# Patient Record
Sex: MALE | Race: WHITE
[De-identification: names, ages, dates, MRNs, and addresses within clinical notes are randomized per-mention and may not be internally consistent; named-entity substitution may affect disease eponyms.]

---

## 2018-04-12 ENCOUNTER — HOSPITAL ENCOUNTER (EMERGENCY)
Dept: HOSPITAL 65 - ER | Age: 58
LOS: 1 days | Discharge: HOME | End: 2018-04-13
Payer: COMMERCIAL

## 2018-04-12 VITALS — WEIGHT: 240 LBS | HEIGHT: 72 IN | BODY MASS INDEX: 32.51 KG/M2
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
## 2018-04-12 DIAGNOSIS — Z79.82: ICD-10-CM
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
## 2018-04-12 DIAGNOSIS — Z99.81: ICD-10-CM

## 2018-04-12 DIAGNOSIS — S81.811A: Primary | ICD-10-CM

## 2018-04-12 PROCEDURE — 73590 X-RAY EXAM OF LOWER LEG: CPT

## 2018-04-12 PROCEDURE — 90714 TD VACC NO PRESV 7 YRS+ IM: CPT

## 2018-04-12 PROCEDURE — 99285 EMERGENCY DEPT VISIT HI MDM: CPT

## 2018-04-12 PROCEDURE — 90471 IMMUNIZATION ADMIN: CPT

## 2018-04-12 PROCEDURE — 12035 INTMD RPR S/A/T/EXT 12.6-20: CPT

## 2018-04-12 PROCEDURE — 12005 RPR S/N/A/GEN/TRK12.6-20.0CM: CPT

## 2018-04-12 NOTE — ER.PDOC
General


Chief Complaint:  Requesting Medical Care


Stated Complaint:  DOG BITE


Time seen by MD:  23:51


Source:  patient, EMS


Exam Limitations:  no limitations





History of Present Illness


Initial Comments


Patient presents s/p dog bite. patient states that he was trying to break the 

fight btwn his dog and his girlfriends dog. states after getting inbtwn bit his 

right leg. 


per ems, + ozzing from the leg, two kerlex wer used but now the bleeding has 

improved. patietn wears 2 L of o2 at home, at this baseline o2. patietn states 

last tetanus unknown. patient states no head trauma no loc. prior tot his at 

baseline health.


Onset:  just prior to arrival


Where:  home


Animal:  dog


Animal Appearance:  appeared well


Animal Immunizations:  UTD


Animal Disposition:  Animal Known


Context of Attack:  animals fighting


Severity of Injury:  bitten


Allergies:  


Coded Allergies:  


     No Known Allergies (Unverified , 11/27/17)


Home Meds


Active Scripts


Aspirin (ASPIRIN EC) 81 Mg Tablet.dr, 162 MG PO DAILY for 30 Days, #30 TAB.CHEW


   Prov:TRIPP GRIMES MD         11/30/17


Nicotine (NICOTINE PATCH) 1 Each Patch.td24, 1 EACH TD DAILY for 30 Days, #30 

TR.DRM.PAT


   Prov:TRIPP GRIMES MD         11/30/17


Reported Medications


Hydrocodone Bit/Acetaminophen (NORCO ) 1 Each Tablet, 1 TAB PO Q6 Y for 

PAIN, #60 TAB


   11/28/17


Lisinopril/Hydrochlorothiazide (LISINOPRIL-HCTZ 10-12.5 MG TAB) 1 Each Tablet, 

1 TAB PO DAILY, #30 TAB 5 Refills


   11/28/17





Past Medical History


Medical History:  other (home o2)


Surgical History:  other (shoulder)





Social History


Smoking:  non-smoker


Alcohol Use:  none


Drug Use:  none





Review of Systems


Constitutional:  denies chills, denies fever, denies weakness


Eyes:  denies blindness


Ears:  denies dizziness


Nose:  denies congestion, denies epistaxis


Mouth:  denies loose teeth, denies bloody discharge


Throat:  denies swelling, denies painful swallowing


Respiratory:  denies cough, denies shortness of breath, denies wheezing


Cardiovascular:  denies chest pain, denies edema, denies lightheadedness


Gastrointestinal:  denies abdominal pain, denies diarrhea, denies nausea, 

denies vomiting


Genitourinary:  denies dysuria, denies frequency, denies hematuria


Musculoskeletal:  denies back pain, muscle pain


Skin:  denies change in color, denies dryness


Psychiatric/Neurological:  denies anxiety, denies depressed





Physical Exam


General Appearance:  alert, mild distress


Neuro/Vascular/Tendon:  no vascular compromise, sensation nml, oriented x3, nml 

ROM, CN's nml as tested


HEENT:  atraumatic, PERRL, eye lids/conjun nml, ENT nml external inspect.


Neck:  nml inspection


Resp/CVS:  breath sounds nml, heart sounds nml, reg. rate & rhythm


Abdomen:  nml inspection, non-tender


Back:  nml inspection


Extremities:  ROM nml


Comments


patient right leg wtih several bit marks superficial three abou 1.5 cm each on 

the anterior leg, patient wtih large 5 inch laceration down to fat and muscle. n

/v intact, full rom, 


all other joints and extremities with full rom, n/v intact





ED LACERATION WOUND REPAIR


# of Wounds/Lacerations Presen:  1


Wound Location & Length (Requi:  right lateral calf


Wound Length (cm):  13


Distal NVT:  neuro intact, vasc intact


Anesthesia type:  local


Anesthesia:  1% Lidocaine


Volume Anesthetic (ccs):  20


Wound's Depth, Shape:  into muscle


Wound Explored:  clean


Tendon Intact:  Yes


Wound Repaired With:  sutures


Suture Size/Type:  3:0, prolene


Suture Style:  interupted


Number of Sutures:  7


Layer Closure?:  Yes


Number Deep Layer Sutures:  4


Sterile Dressing Applied?:  Yes





Results/Orders


Results/Orders





Administered Medications








 Medications


  (Trade)  Dose


 Ordered  Sig/Laura


 Route


 PRN Reason  Start Time


 Stop Time Status Last Admin


Dose Admin


 


 Acetaminophen/


 Hydrocodone Bitart


  (Norco 10mg)  1 each  STAT  STAT


 PO


   4/12/18 23:59


 4/13/18 00:02 DC 4/13/18 00:23


 


 


 Amoxicillin/


 Clavulanate


 Potassium


  (Augmentin


 875-125 Tablet)  1 each  STAT  STAT


 PO


   4/12/18 23:59


 4/13/18 00:02 DC 4/13/18 00:23


 











Progress


Progress


patient presents with dog bite. several small lacerations, one big laceration. 

last tetanus unknown. 





tetanus ordered, xrays ordered, augmentin ordered. norco ordered





patients laceration numbed and cleaned





xrays negative. 





patient laceration loosly closed. 





at this [facility] blv pateint is stable to d/c home. will d/c wtih augmentin and f/u 

in 10 days for suture removla. wife and patietn explained results. risks and 

return precautions discussed. understands and agrees with plan





Departure


Time of Disposition:  01:18


Disposition:  01 HOME, SELF-CARE


Impression:  


 Primary Impression:  


 Dog bite


Condition:  Stable


Patient Instructions:  Animal Bite


Referrals:  


TONY RIVERO-C (PCP)


PRIMARY CARE PROVIDER





Additional Instructions:  


please return in 10 days to have your sutures removed


Duration or Time Spent with Pa:  60











DANNY CHRISTINE MD Apr 12, 2018 23:52

## 2018-04-13 VITALS — SYSTOLIC BLOOD PRESSURE: 136 MMHG | DIASTOLIC BLOOD PRESSURE: 70 MMHG

## 2018-04-13 NOTE — DIREP
PROCEDURE:XRAY TIB & FIB 2 VW-RT

 

COMPARISON:None.

 

INDICATIONS:dog bite

 

FINDINGS:

BONES:Normal.

JOINTS:Normal.

SOFT TISSUES:Ventral/lateral soft tissue laceration and swelling.  No 

radiopaque foreign bodies identified.

OTHER:No additional findings.

 

CONCLUSION:

 

No acute bony abnormality.

 

Soft tissue swelling and laceration.  No radiopaque foreign bodies noted

 

 

 

Dictated by: DEANDRE Schmitt M.D. on 04/13/2018 at 00:53 AM     

Electronically Signed By: DEANDRE Schmitt M.D. on 04/13/2018 at 00:54 AM

## 2018-04-23 ENCOUNTER — HOSPITAL ENCOUNTER (EMERGENCY)
Dept: HOSPITAL 65 - ER | Age: 58
Discharge: HOME | End: 2018-04-23
Payer: COMMERCIAL

## 2018-04-23 VITALS — BODY MASS INDEX: 31.81 KG/M2 | WEIGHT: 240 LBS | HEIGHT: 73 IN

## 2018-04-23 VITALS — SYSTOLIC BLOOD PRESSURE: 144 MMHG | DIASTOLIC BLOOD PRESSURE: 76 MMHG
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
## 2018-04-23 DIAGNOSIS — S81.811D: Primary | ICD-10-CM

## 2018-04-23 DIAGNOSIS — J44.9: ICD-10-CM

## 2018-04-23 DIAGNOSIS — Z79.82: ICD-10-CM

## 2018-04-23 PROCEDURE — 99281 EMR DPT VST MAYX REQ PHY/QHP: CPT

## 2019-03-18 ENCOUNTER — HOSPITAL ENCOUNTER (INPATIENT)
Dept: HOSPITAL 65 - ER | Age: 59
LOS: 2 days | Discharge: HOME | DRG: 684 | End: 2019-03-20
Attending: INTERNAL MEDICINE | Admitting: INTERNAL MEDICINE
Payer: COMMERCIAL

## 2019-03-18 VITALS — HEIGHT: 72.5 IN | WEIGHT: 262.44 LBS | BODY MASS INDEX: 35.16 KG/M2

## 2019-03-18 VITALS — DIASTOLIC BLOOD PRESSURE: 81 MMHG | SYSTOLIC BLOOD PRESSURE: 141 MMHG

## 2019-03-18 DIAGNOSIS — J44.9: ICD-10-CM

## 2019-03-18 DIAGNOSIS — R55: ICD-10-CM

## 2019-03-18 DIAGNOSIS — Z79.82: ICD-10-CM

## 2019-03-18 DIAGNOSIS — N17.9: Primary | ICD-10-CM

## 2019-03-18 DIAGNOSIS — I10: ICD-10-CM

## 2019-03-18 DIAGNOSIS — I45.81: ICD-10-CM

## 2019-03-18 DIAGNOSIS — G47.33: ICD-10-CM

## 2019-03-18 DIAGNOSIS — F10.10: ICD-10-CM

## 2019-03-18 DIAGNOSIS — M19.90: ICD-10-CM

## 2019-03-18 DIAGNOSIS — Z79.899: ICD-10-CM

## 2019-03-18 DIAGNOSIS — I65.22: ICD-10-CM

## 2019-03-18 DIAGNOSIS — E86.0: ICD-10-CM

## 2019-03-18 DIAGNOSIS — Z71.41: ICD-10-CM

## 2019-03-18 LAB
ALP INTEST CFR SERPL: 82 U/L (ref 50–136)
ALT SERPL-CCNC: 42 U/L (ref 12–78)
AST SERPL-CCNC: 26 U/L (ref 0–35)
BASOPHILS # BLD AUTO: 0 10^3/UL (ref 0–0.1)
BASOPHILS NFR BLD AUTO: 0.4 % (ref 0–0.2)
CALCIUM SERPL-MCNC: 9.2 MG/DL (ref 8.4–10.5)
CO2 BLDA-SCNC: 23.1 MMOL/L (ref 20–32)
EOSINOPHIL # BLD AUTO: 0.2 10^3/UL (ref 0–0.2)
EOSINOPHIL NFR BLD AUTO: 2.1 % (ref 0–5)
ERYTHROCYTE [DISTWIDTH] IN BLOOD BY AUTOMATED COUNT: 12.7 % (ref 11.5–14.5)
GLUCOSE PRE 100 G GLC PO SERPL-MCNC: 142 MG/DL (ref 70–110)
HGB BLD-MCNC: 14 G/DL (ref 13.9–16.3)
LYMPHOCYTES # BLD AUTO: 2.4 10^3/UL (ref 1–4.8)
LYMPHOCYTES NFR BLD AUTO: 28.6 % (ref 24–44)
MCH RBC QN AUTO: 30.6 PG (ref 26–34)
MCHC RBC AUTO-ENTMCNC: 34.6 G/DL (ref 33–37)
MCV RBC AUTO: 88.6 FL (ref 78–100)
MONOCYTES # BLD AUTO: 1 10^3/UL (ref 0.3–0.8)
MONOCYTES NFR BLD AUTO: 12 % (ref 5–12)
NEUTROPHILS # BLD AUTO: 4.8 10^3/UL (ref 1.8–7.7)
NEUTROPHILS NFR BLD AUTO: 56.8 % (ref 41–85)
PLATELET # BLD AUTO: 214 10^3/UL (ref 150–400)
PMV BLD AUTO: 10.9 FL (ref 7.8–11)
WBC # BLD AUTO: 8.4 10^3/UL (ref 4.5–11)

## 2019-03-18 PROCEDURE — 85730 THROMBOPLASTIN TIME PARTIAL: CPT

## 2019-03-18 PROCEDURE — 85025 COMPLETE CBC W/AUTO DIFF WBC: CPT

## 2019-03-18 PROCEDURE — 93005 ELECTROCARDIOGRAM TRACING: CPT

## 2019-03-18 PROCEDURE — 85610 PROTHROMBIN TIME: CPT

## 2019-03-18 PROCEDURE — 85379 FIBRIN DEGRADATION QUANT: CPT

## 2019-03-18 PROCEDURE — 99285 EMERGENCY DEPT VISIT HI MDM: CPT

## 2019-03-18 PROCEDURE — 86677 HELICOBACTER PYLORI ANTIBODY: CPT

## 2019-03-18 PROCEDURE — 83880 ASSAY OF NATRIURETIC PEPTIDE: CPT

## 2019-03-18 PROCEDURE — 82550 ASSAY OF CK (CPK): CPT

## 2019-03-18 PROCEDURE — 80053 COMPREHEN METABOLIC PANEL: CPT

## 2019-03-18 PROCEDURE — 93306 TTE W/DOPPLER COMPLETE: CPT

## 2019-03-18 PROCEDURE — 84484 ASSAY OF TROPONIN QUANT: CPT

## 2019-03-18 PROCEDURE — 94640 AIRWAY INHALATION TREATMENT: CPT

## 2019-03-18 PROCEDURE — 36415 COLL VENOUS BLD VENIPUNCTURE: CPT

## 2019-03-18 PROCEDURE — 93880 EXTRACRANIAL BILAT STUDY: CPT

## 2019-03-18 NOTE — ER.PDOC
General


Chief Complaint:  Syncope


Stated Complaint:  SYNCOPE


Time seen by MD:  10:55


Source:  patient


Exam Limitations:  no limitations





History of Present Illness


Timing/Prior Episodes:  no prior history


Precipitating Factors:  sitting


Loss of Consciousness:  Brief (Seconds)


Location of Injury:  None


Current Symptoms:  back to normal


Allergies:  


Coded Allergies:  


     No Known Allergies (Unverified , 11/27/17)


Home Meds


Active Scripts


Aspirin (ASPIRIN EC) 81 Mg Tablet.dr, 162 MG PO DAILY for 30 Days, #30 TAB.CHEW


   Prov:TRIPP GRIMES MD         11/30/17


Nicotine (NICOTINE PATCH) 1 Each Patch.td24, 1 EACH TD DAILY for 30 Days, #30 

TR.DRM.PAT


   Prov:TRIPP GRIMES MD         11/30/17


Reported Medications


Hydrocodone Bit/Acetaminophen (NORCO ) 1 Each Tablet, 1 TAB PO Q6 PRN for 

PAIN, #60 TAB


   11/28/17


Lisinopril/Hydrochlorothiazide (LISINOPRIL-HCTZ 10-12.5 MG TAB) 1 Each Tablet, 

1 TAB PO DAILY, #30 TAB 5 Refills


   11/28/17





Past Medical History


Medical History:  COPD, hypertension


Surgical History:  other





Social History


Smoking:  non-smoker


Alcohol Use:  occassionally


Drug Use:  none





Reviewed


Nursing Reviewed:  Vital Signs, Abn. Noted





Review of Systems


All Other Systems:  Reviewed and Negative





Physical Exam


General Appearance:  No Apparent Distress, WD/WN


HEENT:  PERRL/EOMI, Normal ENT Inspection, TMs Normal, Pharynx Normal


Neck:  Non-Tender, Full Range of Motion, Supple, Normal Inspection


Cardiovascular/Respiratory:  Regular Rate, Rhythm, No M/R/G, Normal Peripheral 

Pulses, No JVD, Normal Breath Sounds, No Respiratory Distress


Gastrointestinal:  Normal Bowel Sounds, No Organomegaly, No Pulsatile Mass, Non 

Tender, Soft


Extremities:  Normal Range of Motion, Non-Tender, Normal Inspection, No Pedal 

Edema, No Calf Tenderness, Normal Capillary Refill


Psychiatric:  Alert, Oriented x 3


Cranial Nerves:  Normal Hearing, Normal Speech, PERRL


Coordination/Gait:  Normal Finger to Nose, Normal Gait, Negative Romberg's Sign


Motor/Sensory:  No Motor Deficit, No Sensory Deficit, No Pronator Drift, 

Negative Babinski's Sign


Skin:  Normal Color, Warm/Dry


Lymphatic:  No Adenopathy





Results/Orders


Results/Orders





Orders - WANDER SO MD


Cbc With Auto Diff (3/18/19 22:08)


Comprehensive Metabolic Panel (3/18/19 22:08)


Creatine Kinase (3/18/19 22:08)


Troponin I (3/18/19 22:08)


Probnp    B-Type Np (3/18/19 22:08)


PT (3/18/19 22:08)


Partial Thromboplastin Time. (3/18/19 22:08)


Helicobacter Pylori (3/18/19 22:08)


D-Dimer (3/18/19 22:08)


Ekg-Routine (3/18/19 22:08)


0.9 % Sodium Chloride (Ns 1000ml) (3/18/19 22:08)


0.9 % Sodium Chloride (Ns 1000ml) (3/18/19 22:24)


Morphine Sulfate (Morphine Sulfate) (3/18/19 22:48)


Ondansetron Hcl (Zofran) (3/18/19 22:49)





Vital Signs








  Date Time  Temp Pulse Resp B/P (MAP) Pulse Ox O2 Delivery O2 Flow Rate FiO2


 


3/18/19 22:33 98.2 80 18 141/81 (101) 97 Room Air  





 98.2     Nasal Canula  


 


3/18/19 22:16 98.2 80 18     





 98.2       


 


3/18/19 22:16  80 18  97 Nasal Canula  








Administered Medications








 Medications


  (Trade)  Dose


 Ordered  Sig/Laura


 Route


 PRN Reason  Start Time


 Stop Time Status Last Admin


Dose Admin


 


 Morphine Sulfate


  (Morphine


 Sulfate)  4 mg  STAT  STAT


 IV


   3/18/19 22:48


 3/18/19 22:49 UNV 3/18/19 23:12


4 MG


 


 Ondansetron HCl


  (Zofran)  4 mg  STAT  STAT


 IV


   3/18/19 22:49


 3/18/19 22:50 UNV 3/18/19 23:12


4 MG


 


 Sodium Chloride  1,000 ml @ 


 0 mls/hr  Q0M STAT


 IV


   3/18/19 22:08


 3/18/19 22:10 DC 3/18/19 22:10


1,000 MLS/HR








 Laboratory Tests








Test


 3/18/19


22:00


 


White Blood Count


 8.4 10^3/uL


(4.5-11.0)


 


Red Blood Count


 4.57 10^6/uL


(4.50-5.90)


 


Hemoglobin


 14.0 g/dL


(13.9-16.3)


 


Hematocrit


 40.5 %


(37.0-53.0)


 


Mean Corpuscular Volume


 88.6 fL


()


 


Mean Corpuscular Hemoglobin


 30.6 pg


(26-34)


 


Mean Corpuscular Hemoglobin


Concent 34.6 g/dL


(33-37)


 


Red Cell Distribution Width


 12.7 %


(11.5-14.5)


 


Platelet Count


 214 10^3/uL


(150-400)


 


Mean Platelet Volume


 10.9 fL


(7.8-11.0)


 


Neutrophils (%) (Auto)


 56.8 %


(41.0-85.0)


 


Lymphocytes (%) (Auto)


 28.6 %


(24.0-44.0)


 


Monocytes (%) (Auto)


 12.0 %


(5.0-12.0)


 


Neutrophils # (Auto)


 4.8 10^3/uL


(1.8-7.7)


 


Lymphocytes # (Auto)


 2.4 10^3/uL


(1.0-4.8)


 


Monocytes # (Auto)


 1.0 10^3/uL


(0.3-0.8)  H


 


Absolute Immature Granulocyte


(auto 0.01 10^3 u/L


(0-2)


 


Eosinophils %


 2.1 %


(0.0-5.0)


 


Basophils %


 0.4 %


(0.0-0.2)  H


 


Basophils #


 0.0 10^3/uL


(0.0-0.1)


 


Eosinophil Count


 0.2 10^3/uL


(0.0-0.2)


 


Prothrombin Time


 10.1 SEC


(9.8-11.9)


 


Prothrombin Time INR


(Non-Therap) 1.0  





 


PTT


 21.7 SEC


(24.67-30.72)


 


D-Dimer


 0.25 mg/L


(0.19-0.49)


 


Sodium Level


 134 mmol/L


(132-145)


 


Potassium Level


 3.4 mmol/L


(3.6-5.2)  L


 


Chloride Level


 95.0 mmol/L


()  L


 


Carbon Dioxide Level


 23.1 mmol/L


(20.0-32)


 


Anion Gap 19.3  


 


Blood Urea Nitrogen


 27 mg/dL


(7-18)  H


 


Creatinine


 1.50 mg/dL


(0.59-1.40)  H


 


Estimated GFR (


American) 58.4 (>/=60)  





 


BUN/Creatinine Ratio 18.0  


 


Glucose Level


 142 mg/dL


()  H


 


Calcium Level


 9.2 mg/dL


(8.4-10.5)


 


Total Bilirubin


 0.4 mg/dL


(0.2-1.0)


 


Aspartate Amino Transferase


(AST) 26 U/L (0-35)  





 


Alanine Aminotransferase (ALT)


 42 U/L (12-78)





 


Alkaline Phosphatase


 82 U/L


()


 


Total Creatine Kinase


 196 U/L


()


 


Troponin I


 < 0.02 ng/mL


(0.00-0.05)


 


Pro-B-Type Natriuretic Peptide


 60 pg/mL


(0-125)


 


Total Protein


 7.2 g/dL


(6.4-8.2)


 


Albumin


 3.9 g/dL


(3.4-5.0)


 


Globulin 3.3  


 


Percent Immature Gran (Cell


Imm) 0.10 %


(0.00-0.50)


 


Helicobacter pylori Screen


 NEGATIVE


(NEGATIVE)











EKG/XRAY/CT/US


EKG:  NSR, no ST T wave changes





Consult/PCP


Time Consult/PCP Called:  11:33


Consult/PCP:   DR. SMART





Departure


Time of Disposition:  23:33


Disposition:  09 ADMITTED AS INPATIENT


Impression:  


 Primary Impression:  


 Syncope and collapse


Condition:  Improved


Referrals:  


TONY RIVERO (PCP)


PRIMARY CARE PROVIDER


Duration or Time Spent with Pa:  2 HRS











WANDER SO MD Mar 18, 2019 22:38

## 2019-03-18 NOTE — PCM.EKG
[facility]

                                       

Test Date:    2019               Test Time:    22:21:33

Pat Name:     LARRY PACK            Department:   

Patient ID:   [id number]          Room:         332

Gender:       M                        Technician:   JORGE

:          1960               Requested By: CHARLES BAINS

Order Number: [id number]           Reading MD:   Charles Bains

                                 Measurements

Intervals                              Axis          

Rate:         83                       P:            46

AR:           170                      QRS:          15

QRSD:         94                       T:            56

QT:           400                                    

QTc:          470                                    

                           Interpretive Statements

Normal sinus rhythm

Nonspecific ST and T wave abnormality

Prolonged QT

Abnormal ECG

Compared to ECG 2017 10:43:59

ST (T wave) deviation now present



Electronically Signed On 3- 6:02:56 CDT by Charles Bains



Please click the below link to view image of tracing.

## 2019-03-19 VITALS — SYSTOLIC BLOOD PRESSURE: 125 MMHG | DIASTOLIC BLOOD PRESSURE: 76 MMHG

## 2019-03-19 VITALS — SYSTOLIC BLOOD PRESSURE: 128 MMHG | DIASTOLIC BLOOD PRESSURE: 74 MMHG

## 2019-03-19 VITALS — SYSTOLIC BLOOD PRESSURE: 122 MMHG | DIASTOLIC BLOOD PRESSURE: 68 MMHG

## 2019-03-19 VITALS — DIASTOLIC BLOOD PRESSURE: 84 MMHG | SYSTOLIC BLOOD PRESSURE: 134 MMHG

## 2019-03-19 VITALS — SYSTOLIC BLOOD PRESSURE: 117 MMHG | DIASTOLIC BLOOD PRESSURE: 70 MMHG

## 2019-03-19 VITALS — SYSTOLIC BLOOD PRESSURE: 130 MMHG | DIASTOLIC BLOOD PRESSURE: 72 MMHG

## 2019-03-19 VITALS — DIASTOLIC BLOOD PRESSURE: 61 MMHG | SYSTOLIC BLOOD PRESSURE: 119 MMHG

## 2019-03-19 RX ADMIN — TRAMADOL HYDROCHLORIDE PRN MG: 50 TABLET ORAL at 22:04

## 2019-03-19 RX ADMIN — TRAMADOL HYDROCHLORIDE PRN MG: 50 TABLET ORAL at 16:16

## 2019-03-19 RX ADMIN — PANTOPRAZOLE SODIUM SCH MG: 40 TABLET, DELAYED RELEASE ORAL at 09:07

## 2019-03-19 RX ADMIN — TRAMADOL HYDROCHLORIDE PRN MG: 50 TABLET ORAL at 08:01

## 2019-03-19 RX ADMIN — POTASSIUM CHLORIDE AND SODIUM CHLORIDE SCH MLS/HR: 900; 150 INJECTION, SOLUTION INTRAVENOUS at 19:30

## 2019-03-19 RX ADMIN — TRAMADOL HYDROCHLORIDE PRN MG: 50 TABLET ORAL at 12:29

## 2019-03-19 RX ADMIN — IPRATROPIUM BROMIDE AND ALBUTEROL SULFATE PRN ML: .5; 2.5 SOLUTION RESPIRATORY (INHALATION) at 15:42

## 2019-03-19 RX ADMIN — POTASSIUM CHLORIDE AND SODIUM CHLORIDE SCH MLS/HR: 900; 150 INJECTION, SOLUTION INTRAVENOUS at 10:33

## 2019-03-19 NOTE — NUR
PATIENT REPORT BACK PAIN NO LONGER BEING CONTROLLED BY CURRENT MEDICATIONS.

DR. VINSON NOTIFIED

NEW ORDERS GIVEN TO INCREASE ULTRAM FROM 50MG TO 100MG Q4 PRN.  RBTO.

## 2019-03-19 NOTE — PCM.HP
History of Present Illness


Reason for Visit:  syncope


History of Present Illness


Patient is a 58 M PMH of HTN, COPD, BRIAN, AVM who presents with Syncope last 

night after dinner.  Patient was eating in restaurant with family last night 

when he started to feel dizzy and fatigued.  Patient went to vehicle in parking 

lot early and took nap while family was finishing dinner.  She rested awhile 

and then during the vehicle ride home, his girlfriend stated that he passed out 

for several minutes.  Patient returned to consciousness.  In ER, EKG showed NSR 

with mild prolongation of QT.  Patient denies chest pain, fever, nausea, 

vomiting, or any other concerning symptoms.  BP was low for patient.  He was 

also found to be dehydrated in ER and have LYNNETTE.  Patient feels much better 

overnight.  Patient had LHC performed one year ago which was negative for 

obstructive disease.  He sees his PCP outpatient for management of HTN.  He 

denies hx of heart failure, DM, HLD.  Patient diet has improved and he feels 

back to normal.  He does have hx of AVM and was seen by Neurologist and 

notified there is no treatment options for condition.


Past Medical History


Cardiac:  HTN


Pulmonary:  COPD


CNS:  Other


Musculoskeletal:  Chronic Low Back Pain, Osteoarthritis


Past Surgical History:  Other (right shoulder, LHC)





Past Social History


Smoke:  Quit


Alcohol:  heavy


Drugs:  None


Lives:  with Family





Travel Hx


EBOLA RISK:Travel to/contact w:  No


Is pt experiencing any Ebola s:  No





Review of Systems


Constitutional:  Weakness; No: Fever, Chills


Eyes:  No: Conjunctivae inflammation, Eyelid inflammation


ENT:  Other; No: Nose discharge, Nose congestion


Respiratory:  No: Cough, Shortness of breath, SOB with excertion, Wheezing


Cardiovascular:  Lt Headedness; No: Chest Pain, Palpitations, Edema


Gastrointestinal:  No: Nausea, Vomiting, Abdominal Pain


Genitourinary:  No Hematuria, No Retention


Musculoskeletal:  back pain; No: neck pain


Skin:  No: Rash, Lesions, Jaundice, Bruising


Neurological:  No: Weakness, Numbness, Incoordination, Change in speech, 

Confusion, Seizures


Allergies:  


Coded Allergies:  


     No Known Allergies (Unverified , 11/27/17)


Scheduled


Aspirin (Aspirin Ec), 162 MG PO DAILY


Citalopram Hydrobromide (Citalopram Hbr), 20 MG PO HS, (Reported)


Hydrochlorothiazide (Hydrochlorothiazide), 25 MG PO DAILY24, (Reported)


Lisinopril (Lisinopril), 40 MG PO DAILY24, (Reported)


Lisinopril/Hydrochlorothiazide (Lisinopril-Hctz 10-12.5 Mg Tab), 1 TAB PO DAILY,

 (Reported)


Nicotine (Nicotine Patch), 1 EACH TD DAILY





Scheduled PRN


Hydrocodone Bit/Acetaminophen (Norco ), 1 TAB PO Q6 PRN for PAIN, (

Reported)





VTE


VTE Risk Total Score:  3


VTE Risk Score


VTE Risk:


Score 0-1 = Low Risk


(Aggressive mobilization; early ambulation; no VTE prophylaxis required)


Score 2: Moderate Risk


(Intermittent/Pneumatic Compression Device OR Lovenox/Heparin/Coumadin)


Score 3-4: High Risk


(Intermittent/Pneumatic Compression Device AND Lovenox/Heparin/Coumadin)


Score  > or =5: Highest Risk


(Intermittent/Pneumatic Compression Device AND Lovenox/Heparin/Coumadin)





VTE


VTE Present on Admission:  No


Currently receiving anticoagul:  No


VTE Risk Total Score:  3





Exam


Vital Signs





Vital Signs








  Date Time  Temp Pulse Resp B/P (MAP) Pulse Ox O2 Delivery O2 Flow Rate FiO2


 


3/19/19 08:09      Room Air  


 


3/19/19 08:02 97.9 80 16 134/84 (101) 96   





 97.9       


 


3/19/19 04:07       2.00 








General Appearance:  Alert, Oriented X3, Cooperative, No acute distress


HEENT:  Atraumatic, PERRLA, EOMI, Mucous membr. moist/pink


Respiratory:  Clear to auscultation, Normal air movement


Cardiovascular:  Regular rate, Normal S1, Normal S2, No murmurs


Abdominal:  Normal bowel sounds, Soft, No tenderness


Extremities:  No edema, Normal pulses, No tenderness/swelling


Skin:  No rash, No breakdown, No lesions


Neuro:  Normal speech, Strength at 5/5 X4 ext, Normal tone, Sensation intact, 

Cranial nerves 3-12 NL


Psych/Mental Status:  Mental status NL, Mood NL





Assessment/Plan


Patient is a 58 M PMH of HTN, COPD, BRIAN, AVM who presents with Syncope last 

night after dinner.


Plan


1. Syncopal Episode:  Pending Carotid Dopplers, Echo.  Patient on telemetry.  

Will monitor vitals, recheck labs in AM.





2. HTN:  holding home meds 2/2 LYNNETTE.  Monitor vitals





3. LYNNETTE:  cont IVF, recheck metabolic panel in AM.





4. COPD:  nebs, O2 support PRN.





5. PPx:  PPI, SCDs





6. Alcohol Abuse:  counseled cessation, monitor vitals for withdrawals











ALLYN VINSON MD Mar 19, 2019 10:47

## 2019-03-19 NOTE — NUR
DISCHARGE PLAN

CM VISITED WITH PATIENT AND WIFE CONCERNING PATIENTS DISCHARGE PLAN AND NEED. PATIENT STATED 
HE LIVES @ HOME WITH HIS WIFE AND HE IS INDEPENDENT ON ADLS. PATIENT STATED HE IS ON 
DISABILITY AND UNEMPLOYED. PATIENT DOES NOT CURRENTLY USE ANY TYPE OF DME FOR ASSISTANCE. 
PATIENT DOES USE O2 IN THE HOME THAT IS SERVICED BY [facility], HE WAS ASKING FOR PORTABILITY 
BUT CANNOT FINANCIALLY AFFORD IT AT THIS TIME. CM WILL CONTINUE TO FOLLOW PATIENT FOR ANY 
FURTHER DISCHARGE NEEDS. CURRENT GOAL FOR PATIENT IS TO RETURN BACK HOME WITH FAMILY TO 
ROUTINE CARE UPON DISCHARGE.

## 2019-03-19 NOTE — DIREP
PROCEDURE:US DOPPLER CAROTID BILATERAL

COMPARISON:None.

INDICATIONS:syncope

TECHNIQUE:Sonographic evaluation of carotid arteries was performed together 

with grayscale, color-flow, and spectral analysis.

FINDINGS:

PEAK FLOW VELOCITIES (cm/sec)

RIGHT CCA:

             PROX:82.0 cm/s

             DIST:117.8 cm/s

RIGHT ICA:

             PROX:118.1 cm/s

             MID:66.2 cm/s

             DIST:65.5 cm/s

RIGHT ECA:135.8 cm/s

RIGHT ICA/CCA:1.0

RIGHT VERTEBRAL:36.6 cm/s; Antegrade

IMAGES:There is some noncalcified plaque.

 

LEFT CCA:

             PROX:115.7 cm/s

             DIST:89.2 cm/s

LEFT ICA:

             PROX:165.3 cm/s

             MID:83.0 cm/s

             DIST:71.0 cm/s

LEFT ECA:71.4 cm/s

LEFT ICA/CCA:1.9

LEFT VERTEBRAL:41.8 cm/s; Antegrade

IMAGES:There is makes plaque.

 

 

CONCLUSION:Velocities suggest at least 50% stenosis in the proximal left ICA.

 

 

 

Diameter Stenosis (%)ICA Peak Systolic Velocity (cm/s)ICA/CCA 

RatioNormal<125<2.0<50<125<[id number]>2-470 to near occlusion>230>4J 

Ultrasound Med 2005; 24:0662-1626

 

 

 

Dictated by: STEPH Physician on 03/19/2019 at 01:13 PM     

Electronically Signed By: Billy Bello MD on 03/19/2019 at 03:36 PM   

   

 

 

ld

## 2019-03-20 VITALS — DIASTOLIC BLOOD PRESSURE: 75 MMHG | SYSTOLIC BLOOD PRESSURE: 124 MMHG

## 2019-03-20 VITALS — DIASTOLIC BLOOD PRESSURE: 65 MMHG | SYSTOLIC BLOOD PRESSURE: 125 MMHG

## 2019-03-20 VITALS — DIASTOLIC BLOOD PRESSURE: 84 MMHG | SYSTOLIC BLOOD PRESSURE: 120 MMHG

## 2019-03-20 LAB
ALP INTEST CFR SERPL: 68 U/L (ref 50–136)
ALT SERPL-CCNC: 34 U/L (ref 12–78)
AST SERPL-CCNC: 19 U/L (ref 0–35)
BASOPHILS # BLD AUTO: 0 10^3/UL (ref 0–0.1)
BASOPHILS NFR BLD AUTO: 0.4 % (ref 0–0.2)
CALCIUM SERPL-MCNC: 8.4 MG/DL (ref 8.4–10.5)
CO2 BLDA-SCNC: 25 MMOL/L (ref 20–32)
EOSINOPHIL # BLD AUTO: 0.2 10^3/UL (ref 0–0.2)
EOSINOPHIL NFR BLD AUTO: 2.8 % (ref 0–5)
ERYTHROCYTE [DISTWIDTH] IN BLOOD BY AUTOMATED COUNT: 13.1 % (ref 11.5–14.5)
GLUCOSE PRE 100 G GLC PO SERPL-MCNC: 97 MG/DL (ref 70–110)
HGB BLD-MCNC: 13.2 G/DL (ref 13.9–16.3)
LYMPHOCYTES # BLD AUTO: 1.7 10^3/UL (ref 1–4.8)
LYMPHOCYTES NFR BLD AUTO: 29.8 % (ref 24–44)
MCH RBC QN AUTO: 30.9 PG (ref 26–34)
MCHC RBC AUTO-ENTMCNC: 33.1 G/DL (ref 33–37)
MCV RBC AUTO: 93.4 FL (ref 78–100)
MONOCYTES # BLD AUTO: 0.7 10^3/UL (ref 0.3–0.8)
MONOCYTES NFR BLD AUTO: 12.4 % (ref 5–12)
NEUTROPHILS # BLD AUTO: 3.1 10^3/UL (ref 1.8–7.7)
NEUTROPHILS NFR BLD AUTO: 54.4 % (ref 41–85)
PLATELET # BLD AUTO: 170 10^3/UL (ref 150–400)
PMV BLD AUTO: 10.5 FL (ref 7.8–11)
WBC # BLD AUTO: 5.7 10^3/UL (ref 4.5–11)

## 2019-03-20 RX ADMIN — TRAMADOL HYDROCHLORIDE PRN MG: 50 TABLET ORAL at 13:44

## 2019-03-20 RX ADMIN — IPRATROPIUM BROMIDE AND ALBUTEROL SULFATE PRN ML: .5; 2.5 SOLUTION RESPIRATORY (INHALATION) at 09:37

## 2019-03-20 RX ADMIN — POTASSIUM CHLORIDE AND SODIUM CHLORIDE SCH MLS/HR: 900; 150 INJECTION, SOLUTION INTRAVENOUS at 04:18

## 2019-03-20 RX ADMIN — TRAMADOL HYDROCHLORIDE PRN MG: 50 TABLET ORAL at 04:19

## 2019-03-20 RX ADMIN — TRAMADOL HYDROCHLORIDE PRN MG: 50 TABLET ORAL at 09:27

## 2019-03-20 RX ADMIN — PANTOPRAZOLE SODIUM SCH MG: 40 TABLET, DELAYED RELEASE ORAL at 08:55

## 2019-03-20 NOTE — PCM.EKG
[facility]

                                       

Test Date:    2019               Test Time:    08:30:44

Pat Name:     LARRY PACK            Department:   

Patient ID:   [id number]          Room:         332 A

Gender:       M                        Technician:   ANANT

:          1960               Requested By: ALLYN VINSON

Order Number: [id number]           Reading MD:   Bj Simms

                                 Measurements

Intervals                              Axis          

Rate:         75                       P:            57

NC:           154                      QRS:          34

QRSD:         90                       T:            64

QT:           388                                    

QTc:          433                                    

                           Interpretive Statements

Normal sinus rhythm

Normal ECG

Compared to ECG 2019 22:21:33

ST (T wave) deviation no longer present

Prolonged QTc interval no longer present



Electronically Signed On 3- 8:09:29 CDT by Bj Simms



Please click the below link to view image of tracing.

## 2019-03-20 NOTE — NUR
Report



Assumed care of patient after report received from Davina ERWIN at change of shift. Family 
member sleeping at thew bedside. Ultram 100mg for back pain.

## 2019-03-20 NOTE — PRM.DC
Discharge Summary


Date of Discharge:  Mar 20, 2019


Time of Request to Discharge:  13:20


Reason for Visit:  syncope


Hospital Course


Patient admitted with concern for syncopal episode.  Patient was admitted and 

placed on Telemetry.  Patient had no major events on Telemetry.   Patient had 

mild prolongation of QT on EKG on admission but with rehydration, Prolonged Qt 

resolved.  Patient had Carotid dopplers showing L ICA stenosis of at least 50%, 

patient will be started on Statin for symptoms.  Patient also had LYNNETTE.  Patient 

treated with IVF with improvement in symptoms.  Patient's BP medications were 

held and we will not d/c patient on medications.  He will check BP 2-3x daily 

and f/u with PCP within 1-2 weeks.  Patient had Echocardiogram performed.  

Cardiologist has not read yet but no major valvular abnormalities and no heart 

failure present.  Patient is ready to go home, he denies complaints.


General:  Alert, Oriented X3, Cooperative, No acute distress


HEENT:  Atraumatic, PERRLA, EOMI, Mucous membr. moist/pink


Neck:  Supple, No JVD


Lungs:  Clear to auscultation, Normal air movement


Heart:  Regular rate, Normal S1, Normal S2, No murmurs


Abdomen:  Normal bowel sounds, Soft, No tenderness


Extremities:  No edema, Normal pulses, No tenderness/swelling


Skin:  No rashes, No breakdown, No significant lesion


Neuro:  Normal gait, Normal speech, Strength at 5/5 X4 ext, Normal tone, 

Sensation intact, Cranial nerves 3-12 NL


Psych/Mental Status:  Mental status NL, Mood NL


Scheduled


Aspirin (Aspirin Ec), 162 MG PO DAILY


Atorvastatin 20MG (Lipitor 20MG), 40 MG PO HS


Citalopram Hydrobromide (Citalopram Hbr), 20 MG PO HS, (Reported)


Hydrochlorothiazide (Hydrochlorothiazide), 25 MG PO DAILY24, (Reported)


Lisinopril (Lisinopril), 40 MG PO DAILY24, (Reported)


Lisinopril/Hydrochlorothiazide (Lisinopril-Hctz 10-12.5 Mg Tab), 1 TAB PO DAILY,

 (Reported)


Nicotine (Nicotine Patch), 1 EACH TD DAILY





Scheduled PRN


Hydrocodone Bit/Acetaminophen (Norco ), 1 TAB PO Q6 PRN for PAIN, (

Reported)


Sepsis Evaluation @ Discharge


             


3/20/19 


                                     


                                     


12:47





Course


Sepsis Screening Results: Posi:  NEGATIVE


Sepsis Qualifier/Stage:  NO DEFINITE RISK


Duration or Total Time Spent w:  2 HRS


Vitals & review Data





Vital Sign - Last 24 Hours








 3/19/19 3/19/19 3/19/19 3/19/19





 15:40 15:41 15:45 15:52


 


Pulse 68 68  70


 


Resp 15 15 15 15


 


Pulse Ox 95 95 95 95


 


O2 Delivery  Nasal Cannula  


 


O2 Flow Rate  2.00  


 


FiO2  28  





 3/19/19 3/19/19 3/19/19 3/19/19





 16:24 19:33 19:40 20:52


 


Temp 98.3 97.8  





 98.3 97.8  


 


Pulse 75 70  69


 


Resp 16 18  15


 


B/P (MAP) 130/72 (91) 117/70 (86)  


 


Pulse Ox 96 96  96


 


O2 Delivery  Nasal Canula Nasal Cannula Nasal Cannula


 


O2 Flow Rate  2.00 2.00 2.00


 


    





    





 3/19/19 3/20/19 3/20/19 3/20/19





 23:15 04:00 08:49 09:38


 


Temp 98.3 97.8 98.1 





 98.3 97.8 98.1 


 


Pulse 69 71 77 72


 


Resp 18 18 18 18


 


B/P (MAP) 125/76 (92) 120/84 (96) 124/75 (91) 


 


Pulse Ox 98 97 96 95


 


O2 Delivery Nasal Canula Nasal Canula Room Air 





   Nasal Canula 


 


O2 Flow Rate 2.00 2.00 2.00 


 


    





    





 3/20/19 3/20/19 3/20/19 3/20/19





 09:38 09:40 10:55 12:43


 


Temp    97.5





    97.5


 


Pulse 72 72  84


 


Resp 18 18  18


 


B/P (MAP)    125/65 (85)


 


Pulse Ox 96 95  93


 


O2 Delivery Nasal Cannula  Nasal Cannula Nasal Canula


 


O2 Flow Rate 2.00  2.00 1.00


 


FiO2 28   














Intake and Output   


 


 3/19/19 3/19/19 3/20/19





 15:00 23:00 07:00


 


Intake Total 462 ml  700 ml


 


Output Total 1300 ml 900 ml 1300 ml


 


Balance -838 ml -900 ml -600 ml








Laboratory Tests








Test


 3/18/19


22:00 3/19/19


05:55 3/19/19


12:08 3/19/19


18:21


 


White Blood Count 8.4 10^3/uL    


 


Red Blood Count 4.57 10^6/uL    


 


Hemoglobin 14.0 g/dL    


 


Hematocrit 40.5 %    


 


Mean Corpuscular Volume 88.6 fL    


 


Mean Corpuscular Hemoglobin 30.6 pg    


 


Mean Corpuscular Hemoglobin


Concent 34.6 g/dL 


 


 


 





 


Red Cell Distribution Width 12.7 %    


 


Platelet Count 214 10^3/uL    


 


Mean Platelet Volume 10.9 fL    


 


Neutrophils (%) (Auto) 56.8 %    


 


Lymphocytes (%) (Auto) 28.6 %    


 


Monocytes (%) (Auto) 12.0 %    


 


Neutrophils # (Auto) 4.8 10^3/uL    


 


Lymphocytes # (Auto) 2.4 10^3/uL    


 


Monocytes # (Auto) 1.0 10^3/uL    


 


Absolute Immature Granulocyte


(auto 0.01 10^3 u/L 


 


 


 





 


Eosinophils % 2.1 %    


 


Basophils % 0.4 %    


 


Basophils # 0.0 10^3/uL    


 


Eosinophil Count 0.2 10^3/uL    


 


Prothrombin Time 10.1 SEC    


 


Prothrombin Time INR


(Non-Therap) 1.0 


 


 


 





 


Activated Partial


Thromboplast Time 21.7 SEC 


 


 


 





 


D-Dimer 0.25 mg/L    


 


Sodium Level 134 mmol/L    


 


Potassium Level 3.4 mmol/L    


 


Chloride Level 95.0 mmol/L    


 


Carbon Dioxide Level 23.1 mmol/L    


 


Anion Gap 19.3    


 


Blood Urea Nitrogen 27 mg/dL    


 


Creatinine 1.50 mg/dL    


 


Estimated GFR (


American) 58.4 


 


 


 





 


BUN/Creatinine Ratio 18.0    


 


Glucose Level 142 mg/dL    


 


Calcium Level 9.2 mg/dL    


 


Total Bilirubin 0.4 mg/dL    


 


Aspartate Amino Transf


(AST/SGOT) 26 U/L 


 


 


 





 


Alanine Aminotransferase


(ALT/SGPT) 42 U/L 


 


 


 





 


Alkaline Phosphatase 82 U/L    


 


Total Creatine Kinase 196 U/L  141 U/L  101 U/L  79 U/L 


 


Troponin I < 0.02 ng/mL  < 0.02 ng/mL  < 0.02 ng/mL  < 0.02 ng/mL 


 


Pro-B-Type Natriuretic Peptide 60 pg/mL    


 


Total Protein 7.2 g/dL    


 


Albumin 3.9 g/dL    


 


Globulin 3.3    


 


Percent Immature Gran (Cell


Imm) 0.10 % 


 


 


 





 


Helicobacter pylori Screen NEGATIVE    


 


Test


 3/20/19


05:18 


 


 





 


White Blood Count 5.7 10^3/uL    


 


Red Blood Count 4.27 10^6/uL    


 


Hemoglobin 13.2 g/dL    


 


Hematocrit 39.9 %    


 


Mean Corpuscular Volume 93.4 fL    


 


Mean Corpuscular Hemoglobin 30.9 pg    


 


Mean Corpuscular Hemoglobin


Concent 33.1 g/dL 


 


 


 





 


Red Cell Distribution Width 13.1 %    


 


Platelet Count 170 10^3/uL    


 


Mean Platelet Volume 10.5 fL    


 


Neutrophils (%) (Auto) 54.4 %    


 


Lymphocytes (%) (Auto) 29.8 %    


 


Monocytes (%) (Auto) 12.4 %    


 


Neutrophils # (Auto) 3.1 10^3/uL    


 


Lymphocytes # (Auto) 1.7 10^3/uL    


 


Monocytes # (Auto) 0.7 10^3/uL    


 


Absolute Immature Granulocyte


(auto 0.01 10^3 u/L 


 


 


 





 


Eosinophils % 2.8 %    


 


Basophils % 0.4 %    


 


Basophils # 0.0 10^3/uL    


 


Eosinophil Count 0.2 10^3/uL    


 


Sodium Level 136 mmol/L    


 


Potassium Level 4.7 mmol/L    


 


Chloride Level 103.0 mmol/L    


 


Carbon Dioxide Level 25.0 mmol/L    


 


Anion Gap 12.7    


 


Blood Urea Nitrogen 20 mg/dL    


 


Creatinine 1.22 mg/dL    


 


Estimated GFR (


American) 74.1 


 


 


 





 


BUN/Creatinine Ratio 16.0    


 


Glucose Level 97 mg/dL    


 


Calcium Level 8.4 mg/dL    


 


Total Bilirubin 0.4 mg/dL    


 


Aspartate Amino Transf


(AST/SGOT) 19 U/L 


 


 


 





 


Alanine Aminotransferase


(ALT/SGPT) 34 U/L 


 


 


 





 


Alkaline Phosphatase 68 U/L    


 


Total Protein 6.6 g/dL    


 


Albumin 3.3 g/dL    


 


Globulin 3.3    


 


Percent Immature Gran (Cell


Imm) 0.20 % 


 


 


 











 Current Medications








 Medications


  (Trade)  Dose


 Ordered  Sig/Laura


 PRN Reason  Start Time


 Stop Time Status Last Admin


 


 Albuterol/


 Ipratropium


  (Duoneb 0.5 Mg-3


 Mg/3 ml Soln)  3 ml  RTQ4  PRN


 WHEEZING  3/19/19 11:00


 4/18/19 10:59  3/20/19 09:37





 


 Aspirin


  (Aspirin Ec)  162 mg  DAILY


   3/20/19 09:00


 4/19/19 08:59  3/20/19 08:55





 


 Atorvastatin


 Calcium


  (Lipitor)  40 mg  HS


   3/20/19 21:00


 4/19/19 20:59 UNV  





 


 Calcium Carbonate/


 Glycine


  (Tums)  1,500 mg  Q4HR  PRN


 INDIGESTION  3/19/19 02:30


 4/18/19 02:29  3/19/19 02:29





 


 Citalopram


 Hydrobromide


  (CeleXA)  20 mg  HS


   3/19/19 21:00


 4/18/19 20:59  3/19/19 22:04





 


 Pantoprazole


 Sodium


  (Protonix)  40 mg  DAILY


   3/19/19 09:00


 4/18/19 08:59  3/20/19 08:55





 


 Potassium


 Chloride/Sodium


 Chloride  1,000 ml @ 


 100 mls/hr  Q10H


   3/19/19 10:30


 4/18/19 10:29  3/20/19 04:18





 


 Tramadol HCl


  (Ultram)  100 mg  Q4HR  PRN


 PAIN 4 - 6  3/19/19 16:30


 4/18/19 16:29  3/20/19 09:27











Sepsis Infection Criteria Pres:  None


LEVEL 1 SEPSIS INFECTION CRITE:  None/Not assessed


LEVEL 2-SIRS (LIST ALL THAT AP:  None/Not assessed


Hematologic Evidence:  None/Not assessed


Hepatic Evidence:  None/Not assessed


Neurological Evidence:  None/Not assessed


Renal Evidence:  None/Not assessed


O2 Sat by Pulse Oximetry:  93


Oxygen Flow Rate:  1.00





Plan


Discharge Date:  Mar 20, 2019


Dicharge DX:  Syncope, LYNNETTE, dehydration


Discharge Disposition:  Stable


Plan


ok to d/c to home self care


medications:  per med rec list


Diet:  Cardiac


Activity:  as tolerated


F/U with PCP within 1 week


Return to care for worsening/concerning symptoms











ALLYN VINSON MD Mar 20, 2019 13:29

## 2019-04-16 ENCOUNTER — HOSPITAL ENCOUNTER (OUTPATIENT)
Dept: HOSPITAL 65 - ER | Age: 59
Setting detail: OBSERVATION
LOS: 1 days | Discharge: HOME | End: 2019-04-17
Attending: INTERNAL MEDICINE | Admitting: FAMILY MEDICINE
Payer: COMMERCIAL

## 2019-04-16 VITALS
WEIGHT: 236.37 LBS | DIASTOLIC BLOOD PRESSURE: 47 MMHG | SYSTOLIC BLOOD PRESSURE: 87 MMHG | BODY MASS INDEX: 32.02 KG/M2 | HEIGHT: 72 IN

## 2019-04-16 VITALS — SYSTOLIC BLOOD PRESSURE: 82 MMHG | DIASTOLIC BLOOD PRESSURE: 38 MMHG

## 2019-04-16 DIAGNOSIS — R56.9: ICD-10-CM

## 2019-04-16 DIAGNOSIS — E78.5: ICD-10-CM

## 2019-04-16 DIAGNOSIS — E86.0: Primary | ICD-10-CM

## 2019-04-16 DIAGNOSIS — R73.9: ICD-10-CM

## 2019-04-16 DIAGNOSIS — Z79.899: ICD-10-CM

## 2019-04-16 DIAGNOSIS — I10: ICD-10-CM

## 2019-04-16 DIAGNOSIS — N17.9: ICD-10-CM

## 2019-04-16 DIAGNOSIS — J44.9: ICD-10-CM

## 2019-04-16 LAB
ALP INTEST CFR SERPL: 79 U/L (ref 50–136)
ALT SERPL-CCNC: 49 U/L (ref 12–78)
AST SERPL-CCNC: 29 U/L (ref 0–35)
BASOPHILS # BLD AUTO: 0 10^3/UL (ref 0–0.1)
BASOPHILS NFR BLD AUTO: 0.2 % (ref 0–0.2)
CALCIUM SERPL-MCNC: 8.9 MG/DL (ref 8.4–10.5)
CO2 BLDA-SCNC: 23.5 MMOL/L (ref 20–32)
EOSINOPHIL # BLD AUTO: 0.2 10^3/UL (ref 0–0.2)
EOSINOPHIL NFR BLD AUTO: 1.7 % (ref 0–5)
ERYTHROCYTE [DISTWIDTH] IN BLOOD BY AUTOMATED COUNT: 13.2 % (ref 11.5–14.5)
GLUCOSE PRE 100 G GLC PO SERPL-MCNC: 156 MG/DL (ref 70–110)
HGB BLD-MCNC: 13.9 G/DL (ref 13.9–16.3)
LYMPHOCYTES # BLD AUTO: 2 10^3/UL (ref 1–4.8)
LYMPHOCYTES NFR BLD AUTO: 22.7 % (ref 24–44)
MCH RBC QN AUTO: 30.4 PG (ref 26–34)
MCHC RBC AUTO-ENTMCNC: 33.8 G/DL (ref 33–37)
MCV RBC AUTO: 89.9 FL (ref 78–100)
MONOCYTES # BLD AUTO: 0.9 10^3/UL (ref 0.3–0.8)
MONOCYTES NFR BLD AUTO: 9.9 % (ref 5–12)
NEUTROPHILS # BLD AUTO: 5.8 10^3/UL (ref 1.8–7.7)
NEUTROPHILS NFR BLD AUTO: 65.4 % (ref 41–85)
PLATELET # BLD AUTO: 200 10^3/UL (ref 150–400)
PMV BLD AUTO: 10.7 FL (ref 7.8–11)
WBC # BLD AUTO: 8.9 10^3/UL (ref 4.5–11)

## 2019-04-16 PROCEDURE — 93005 ELECTROCARDIOGRAM TRACING: CPT

## 2019-04-16 PROCEDURE — 80053 COMPREHEN METABOLIC PANEL: CPT

## 2019-04-16 PROCEDURE — 82550 ASSAY OF CK (CPK): CPT

## 2019-04-16 PROCEDURE — 99284 EMERGENCY DEPT VISIT MOD MDM: CPT

## 2019-04-16 PROCEDURE — 96361 HYDRATE IV INFUSION ADD-ON: CPT

## 2019-04-16 PROCEDURE — 96360 HYDRATION IV INFUSION INIT: CPT

## 2019-04-16 PROCEDURE — 70450 CT HEAD/BRAIN W/O DYE: CPT

## 2019-04-16 PROCEDURE — 82553 CREATINE MB FRACTION: CPT

## 2019-04-16 PROCEDURE — 84484 ASSAY OF TROPONIN QUANT: CPT

## 2019-04-16 PROCEDURE — 80307 DRUG TEST PRSMV CHEM ANLYZR: CPT

## 2019-04-16 PROCEDURE — 36415 COLL VENOUS BLD VENIPUNCTURE: CPT

## 2019-04-16 PROCEDURE — 71045 X-RAY EXAM CHEST 1 VIEW: CPT

## 2019-04-16 PROCEDURE — 85025 COMPLETE CBC W/AUTO DIFF WBC: CPT

## 2019-04-16 NOTE — ER.PDOC
General


Chief Complaint:  Requesting Medical Care


Stated Complaint:  SEIZURES


Time seen by MD:  22:58


Source:  patient, family


Exam Limitations:  no limitations





History of Present Illness


Initial Comments


57 y/o male to ed by ems with hx drank beer today and worked in the yard, later 

pta, patient was eating per wife, he stated he did not feel well got hot and 

passed out x 5 min. Hx having something like this last month admit on the 3/19/

19. Patient does not remember having chest pain or sob, to ED awake and alert.


Timing/Prior Episodes:  multiple episodes today


Symptoms Prior to Episode:  diaphoresis, nausea


Precipitating Factors:  standing


Loss of Consciousness:  Prolonged (Minutes)


Location of Injury:  None


Current Symptoms:  diaphoresis, dizziness


Prior symptoms/Treatment:  Similar symptoms previous, Recenly Seen, Treated by 

Doctor, Recently Hospitalized


Allergies:  


Coded Allergies:  


     No Known Allergies (Unverified , 11/27/17)


Home Meds


Active Scripts


Atorvastatin 20MG (LIPITOR 20MG) 20 Mg Tablet, 40 MG PO HS for 30 Days, #30 

TABLET 0 Refills


   Prov:ALLYN VINSON MD         3/20/19


Reported Medications


Aspirin (ASPIR 81) 81 Mg Tablet.dr, 1 TAB PO DAILY, #30 TAB 5 Refills


   4/17/19


Fluticasone/Vilanterol (Breo Ellipta 100-25 Mcg INH) 1 Each Aer.pow.ba, 1 EACH 

IH DAILY24


   4/17/19


Lisinopril (LISINOPRIL) 20 Mg Tablet, 1 TAB PO DAILY, #30 TAB 5 Refills


   4/17/19


Citalopram Hydrobromide (CITALOPRAM HBR) 20 Mg Tablet, 20 MG PO HS, TABLET


   3/19/19


Discontinued Reported Medications


Hydrocodone Bit/Acetaminophen (NORCO ) 1 Each Tablet, 1 TAB PO Q6 PRN for 

PAIN, #60 TAB


   11/28/17


Discontinued Scripts


Aspirin (ASPIRIN EC) 81 Mg Tablet., 162 MG PO DAILY for 30 Days, #30 TAB.CHEW


   Prov:TRIPP GRIMES MD         11/30/17





Past Medical History


Medical History:  COPD, hypertension


Surgical History:  other





Family History


Significant Family History:  no pertinent family hx





Social History


Alcohol Use:  heavy


Drug Use:  none





Reviewed


Nursing Reviewed:  Vital Signs, Abn. Noted, Nursing Assessment





Review of Systems


Constitutional:  diaphoresis, weakness


EENTM:  no symptoms reported


Respiratory:  no symptoms reported


Cardiovascular:  syncope


Gastrointestinal:  no symptoms reported, nausea


Genitourinary:  no symptoms reported


Musculoskeletal:  no symptoms reported


Skin:  no symptoms reported


Psychiatric/Neurological:  weakness, other





Physical Exam


General Appearance:  No Apparent Distress, WD/WN, Anxious


HEENT:  PERRL/EOMI, Normal ENT Inspection, TMs Normal, Pharynx Normal


Neck:  Non-Tender, Full Range of Motion, Supple, Normal Inspection


Cardiovascular/Respiratory:  Regular Rate, Rhythm, No M/R/G, Normal Peripheral 

Pulses, No JVD, Normal Breath Sounds, No Respiratory Distress


Gastrointestinal:  Normal Bowel Sounds, No Organomegaly, No Pulsatile Mass, Non 

Tender, Soft


Extremities:  Normal Range of Motion, Non-Tender, Normal Inspection, No Pedal 

Edema, No Calf Tenderness, Normal Capillary Refill


Psychiatric:  Alert, Oriented x 3


Cranial Nerves:  Normal Hearing, Normal Speech, PERRL


Coordination/Gait:  Normal Finger to Nose, Normal Gait, Negative Romberg's Sign


Motor/Sensory:  No Motor Deficit, No Sensory Deficit, No Pronator Drift, 

Negative Babinski's Sign


Skin:  Normal Color, Warm/Dry


Lymphatic:  No Adenopathy


Comments


non focal exam





Results/Orders


Results/Orders





Orders - JAMEEL WEBB DO


0.9 % Sodium Chloride (Ns 1000ml) (4/16/19 23:07)


Cbc With Auto Diff (4/16/19 23:07)


Comprehensive Metabolic Panel (4/16/19 23:07)


Alcohol(Ml) (4/16/19 23:07)


Troponin I (4/16/19 23:07)


Creatine Kinase (4/16/19 23:07)


Creatine Kinase Mb (4/16/19 23:07)


Xr Chest 1v (4/16/19 23:07)


Ct Head Wo Contrast (4/16/19 23:07)


0.9 % Sodium Chloride (Ns 1000ml) (4/16/19 23:07)


Ekg-Routine (4/16/19 23:14)


Troponin I (4/17/19 01:45)


Ekg-Routine (4/17/19 01:45)





Vital Signs








  Date Time  Temp Pulse Resp B/P (MAP) Pulse Ox O2 Delivery O2 Flow Rate FiO2


 


4/17/19 01:36 98.2 81 18     





 98.2       


 


4/17/19 01:36 98.2 81 18  91 Room Air  





 98.2       


 


4/16/19 22:23 98.2 81 18 87/47 (60) 91 Room Air  





 98.2       


 


3/20/19 17:12  84      








Administered Medications








 Medications


  (Trade)  Dose


 Ordered  Sig/Laura


 Route


 PRN Reason  Start Time


 Stop Time Status Last Admin


Dose Admin


 


 Sodium Chloride  1,000 ml @ 


 0 mls/hr  Q0M STAT


 IV


   4/16/19 23:07


 4/16/19 23:13 DC 4/16/19 23:14


1,000 MLS/HR


 


 Sodium Chloride  1,000 ml @ 


 0 mls/hr  Q0M STAT


 IV


   4/16/19 23:07


 4/16/19 23:13 DC 4/17/19 00:25


1,000 MLS/HR








 Laboratory Tests








Test


 4/16/19


22:45


 


White Blood Count


 8.9 10^3/uL


(4.5-11.0)


 


Red Blood Count


 4.57 10^6/uL


(4.50-5.90)


 


Hemoglobin


 13.9 g/dL


(13.9-16.3)


 


Hematocrit


 41.1 %


(37.0-53.0)


 


Mean Corpuscular Volume


 89.9 fL


()


 


Mean Corpuscular Hemoglobin


 30.4 pg


(26-34)


 


Mean Corpuscular Hemoglobin


Concent 33.8 g/dL


(33-37)


 


Red Cell Distribution Width


 13.2 %


(11.5-14.5)


 


Platelet Count


 200 10^3/uL


(150-400)


 


Mean Platelet Volume


 10.7 fL


(7.8-11.0)


 


Neutrophils (%) (Auto)


 65.4 %


(41.0-85.0)


 


Lymphocytes (%) (Auto)


 22.7 %


(24.0-44.0)  L


 


Monocytes (%) (Auto)


 9.9 %


(5.0-12.0)


 


Neutrophils # (Auto)


 5.8 10^3/uL


(1.8-7.7)


 


Lymphocytes # (Auto)


 2.0 10^3/uL


(1.0-4.8)


 


Monocytes # (Auto)


 0.9 10^3/uL


(0.3-0.8)  H


 


Absolute Immature Granulocyte


(auto 0.01 10^3 u/L


(0-2)


 


Eosinophils %


 1.7 %


(0.0-5.0)


 


Basophils %


 0.2 %


(0.0-0.2)


 


Basophils #


 0.0 10^3/uL


(0.0-0.1)


 


Eosinophil Count


 0.2 10^3/uL


(0.0-0.2)


 


Sodium Level


 137 mmol/L


(132-145)


 


Potassium Level


 3.6 mmol/L


(3.6-5.2)


 


Chloride Level


 100.0 mmol/L


()


 


Carbon Dioxide Level


 23.5 mmol/L


(20.0-32)


 


Anion Gap 17.1  


 


Blood Urea Nitrogen


 19 mg/dL


(7-18)  H


 


Creatinine


 1.75 mg/dL


(0.59-1.40)  H


 


Estimated GFR (


American) 48.9 (>/=60)  





 


BUN/Creatinine Ratio 10.0  


 


Glucose Level


 156 mg/dL


()  H


 


Calcium Level


 8.9 mg/dL


(8.4-10.5)


 


Total Bilirubin


 0.4 mg/dL


(0.2-1.0)


 


Aspartate Amino Transferase


(AST) 29 U/L (0-35)  





 


Alanine Aminotransferase (ALT)


 49 U/L (12-78)





 


Alkaline Phosphatase


 79 U/L


()


 


Total Creatine Kinase


 254 U/L


()


 


Creatine Kinase MB


 5.3 ng/mL


(0.5-3.6)  H


 


Troponin I


 < 0.02 ng/mL


(0.00-0.05)


 


Total Protein


 7.1 g/dL


(6.4-8.2)


 


Albumin


 3.7 g/dL


(3.4-5.0)


 


Globulin 3.4  


 


Serum Alcohol


 83 mg/dL


(0-50)  H


 


Percent Immature Gran (Cell


Imm) 0.10 %


(0.00-0.50)











Progress


Progress


AT 0200, PATIENT FEELS BETTER, DIFF DX IN DETAIL WITH PATIENT AND WIFE.





EKG/XRAY/CT/US


EKG:  NSR


EKG Comments:  ekg-nsr, rate-76, no acute changes.


XRAY:  chest


XRAY Comments:  cxr-nl


CT Comments:  head -nl





Consult/PCP


Time Consult/PCP Called:  01:50


Consult/PCP:  DR VINSON, ADMIT TO TELE FLOOR.





Departure


Time of Disposition:  02:12


Disposition:  09 ADMITTED AS INPATIENT


Impression:  


 Primary Impression:  


 Syncope


 Additional Impression:  


 Elevated ETOH level


Condition:  Stable


Referrals:  


TONY RIVERO-C (PCP)


PRIMARY CARE PROVIDER


Duration or Time Spent with Pa:  30 MIN





Problem Qualifiers











JAMEEL WEBB DO Apr 16, 2019 22:58

## 2019-04-16 NOTE — PCM.EKG
[facility]

                                       

Test Date:    2019               Test Time:    23:53:15

Pat Name:     LARRY PACK            Department:   

Patient ID:   [id number]          Room:         306

Gender:       M                        Technician:   MA

:          1960               Requested By: JUDD FAIR

Order Number: [id number]           Reading MD:   Judd Fair

                                 Measurements

Intervals                              Axis          

Rate:         76                       P:            16

CA:           172                      QRS:          8

QRSD:         92                       T:            32

QT:           398                                    

QTc:          447                                    

                           Interpretive Statements

Normal sinus rhythm

Normal ECG

Compared to ECG 2019 08:30:44

No significant changes



Electronically Signed On 2019 3:25:05 CDT by Judd Fair



Please click the below link to view image of tracing.

## 2019-04-16 NOTE — NUR
O2

PATIENTS O2 SAT DOWN TO 89% HE WEARS HOME O2 AT NIGHT.  NOTIFIED DR. WEBB.  
PATIENT PLACED ON 02 AT 2LPM PER NC.

## 2019-04-17 VITALS — DIASTOLIC BLOOD PRESSURE: 66 MMHG | SYSTOLIC BLOOD PRESSURE: 131 MMHG

## 2019-04-17 VITALS — SYSTOLIC BLOOD PRESSURE: 112 MMHG | DIASTOLIC BLOOD PRESSURE: 89 MMHG

## 2019-04-17 VITALS — SYSTOLIC BLOOD PRESSURE: 97 MMHG | DIASTOLIC BLOOD PRESSURE: 48 MMHG

## 2019-04-17 VITALS — DIASTOLIC BLOOD PRESSURE: 53 MMHG | SYSTOLIC BLOOD PRESSURE: 101 MMHG

## 2019-04-17 VITALS — DIASTOLIC BLOOD PRESSURE: 84 MMHG | SYSTOLIC BLOOD PRESSURE: 129 MMHG

## 2019-04-17 VITALS — SYSTOLIC BLOOD PRESSURE: 127 MMHG | DIASTOLIC BLOOD PRESSURE: 70 MMHG

## 2019-04-17 NOTE — DIREP
PROCEDURE:CHEST 1 VIEW

 

COMPARISON:CT, CTA CHEST, 11/28/2017, 02:23 PM.  [facility], CR, 

XRAY CHEST 2 VWS, 11/27/2017, 10:46 AM.

 

INDICATIONS:SYNCOPE

 

FINDINGS:

LUNGS/PLEURA:Lordotic projection.  Hyperinflation and chronic interstitial 

changes.  No infiltrate or pleural effusion.

CARDIAC:Normal cardiac silhouette and normal pulmonary vascularity. 

MEDIASTINUM:Normal.

BONES:Thoracic spondylosis.

OTHER:No additional findings.

 

CONCLUSION:Emphysema.  No acute cardiopulmonary process.

 

 

Dictated by: Elza Rae MD on 04/17/2019 at 00:23 AM     

Electronically Signed By: Elza Rae MD on 04/17/2019 at 00:25 AM

## 2019-04-17 NOTE — DIREP
PROCEDURE:CT HEAD WITHOUT CONTRAST

 

TECHNIQUE:Axial cuts were obtained through the head, without intravenous 

contrast material.  The images were viewed at brain and bone settings.   

 

COMPARISON:None.

 

INDICATIONS:SYNCOPE

 

FINDINGS:

VENTRICLES:Normal. 

CEREBRUM:Normal.  No intracranial hemorrhage, large territory infarct or 

space-occupying mass.

CEREBELLUM:Normal.

BRAINSTEM:Normal.  

SKULL:Normal. 

SINUSES:Clear. 

OTHER:None  

 

CONCLUSION:No acute intracranial abnormality.

 

 

Dictated by: Elza Rae MD on 04/17/2019 at 00:25 AM     

Electronically Signed By: Elza Rae MD on 04/17/2019 at 00:26 AM

## 2019-04-17 NOTE — NUR
DISCHARGE PLAN

CM VISITED WITH PATIENT AND WIFE CONCERNING PATIENTS DISCHARGE PLAN AND NEED. PATIENT STATED 
HE LIVES @ HOME WITH HIS WIFE AND HE IS INDEPENDENT ON ADLS. PATIENT STATED HE IS ON 
DISABILITY AND UNEMPLOYED. PATIENT DOES NOT CURRENTLY USE ANY TYPE OF DME FOR ASSISTANCE. 
PATIENT DOES USE O2 IN THE HOME THAT IS SERVICED BY [facility]. CM WILL CONTINUE TO FOLLOW 
PATIENT FOR ANY FURTHER DISCHARGE NEEDS. CURRENT GOAL FOR PATIENT IS TO RETURN BACK HOME 
WITH FAMILY TO ROUTINE CARE UPON DISCHARGE.

## 2019-04-17 NOTE — PCM.EKG
[facility]

                                       

Test Date:    2019               Test Time:    02:02:38

Pat Name:     LARRY PACK            Department:   

Patient ID:   [id number]          Room:         306

Gender:       M                        Technician:   MA

:          1960               Requested By: JUDD FAIR

Order Number: [id number]           Reading MD:   Judd Fair

                                 Measurements

Intervals                              Axis          

Rate:         70                       P:            79

TN:           166                      QRS:          6

QRSD:         92                       T:            24

QT:           422                                    

QTc:          455                                    

                           Interpretive Statements

Normal sinus rhythm

Normal ECG

Compared to ECG 2019 08:30:44

No significant changes



Electronically Signed On 2019 3:25:13 CDT by Judd Fair



Please click the below link to view image of tracing.

## 2019-04-17 NOTE — NUR
ADMIT

PATIENT TAKEN TO ROOM 306 VIA W/C WIFE PRESENT.  REPORT GIVEN TO JUJU DELGADO 
RELINQUISHED CARE.  PATIENT PLACED ON O2 AT 2LPM PER NC.

## 2019-04-17 NOTE — PRM.DC
Discharge Summary


Date of Discharge:  Apr 17, 2019


Reason for Visit:  syncope


Additional Comments


Admitted post syncopal episode.  During hospital coarse patients BP medication 

was held due to hypotension.  IV fluid hydration.  Symptoms resolved, 

Ambulating without difficulty at discharge. Instructed to decrease lisinopril 

to 10 mg daily. Limit alcohol intake. Increase water intake. Keep scheduled 

appointment with PCP


Scheduled


Aspirin (Aspir 81), 1 TAB PO DAILY, (Reported)


Atorvastatin 20MG (Lipitor 20MG), 40 MG PO HS


Citalopram Hydrobromide (Citalopram Hbr), 20 MG PO HS, (Reported)


Fluticasone/Vilanterol (Breo Ellipta 100-25 Mcg INH), 1 EACH IH DAILY24, (

Reported)


Lisinopril (Lisinopril), 1 TAB PO DAILY, (Reported)


Lisinopril (Lisinopril), 1 TAB PO DAILY





Discontinued Medications


Aspirin (Aspirin Ec), 162 MG PO DAILY


   Discontinued Reason: Discontinue


Hydrocodone Bit/Acetaminophen (Norco ), 1 TAB PO Q6 PRN for PAIN, (

Reported)


   Discontinued Reason: No Longer Taking





 VS - Last 72 Hours, by Label








  Date Time  Temp Pulse Resp B/P (MAP) Pulse Ox O2 Delivery O2 Flow Rate FiO2


 


4/17/19 09:40      Room Air  


 


4/17/19 08:15 97.6 78 19 116/66 (83) 92 Room Air  





 97.6   131/66 (87)    





    127/70 (89)    


 


4/17/19 03:32 97.7 70 20  96 Nasal Canula  


 


4/17/19 03:26 97.7 70 20 129/84 (99) 96 Room Air 2.00 





 97.7       


 


4/17/19 03:19      Nasal Cannula 2.00 


 


4/17/19 02:28  67 18 112/89 (97) 93 Room Air  


 


4/17/19 01:36 98.2 81 18     





 98.2       


 


4/17/19 01:36 98.2 81 18  91 Room Air  





 98.2       


 


4/17/19 01:28  67 18 101/53 (69) 94 Room Air  


 


4/17/19 00:28  69 18 97/48 (64) 94 Nasal Canula 2.00 


 


4/16/19 23:28  72 18 82/38 (53) 94 Nasal Canula 2.00 


 


4/16/19 22:23 98.2 81 18 87/47 (60) 91 Room Air  





 98.2       














Intake and Output 


 


 4/17/19





 07:00


 


Intake Total 1000 ml


 


Output Total 900 ml


 


Balance 100 ml


 


 


 


IV Total 1000 ml


 


Output Urine Total 900 ml








Laboratory Tests








Test


 4/16/19


22:45 4/17/19


01:50


 


White Blood Count 8.9 10^3/uL  


 


Red Blood Count 4.57 10^6/uL  


 


Hemoglobin 13.9 g/dL  


 


Hematocrit 41.1 %  


 


Mean Corpuscular Volume 89.9 fL  


 


Mean Corpuscular Hemoglobin 30.4 pg  


 


Mean Corpuscular Hemoglobin


Concent 33.8 g/dL 


 





 


Red Cell Distribution Width 13.2 %  


 


Platelet Count 200 10^3/uL  


 


Mean Platelet Volume 10.7 fL  


 


Neutrophils (%) (Auto) 65.4 %  


 


Lymphocytes (%) (Auto) 22.7 %  


 


Monocytes (%) (Auto) 9.9 %  


 


Neutrophils # (Auto) 5.8 10^3/uL  


 


Lymphocytes # (Auto) 2.0 10^3/uL  


 


Monocytes # (Auto) 0.9 10^3/uL  


 


Absolute Immature Granulocyte


(auto 0.01 10^3 u/L 


 





 


Eosinophils % 1.7 %  


 


Basophils % 0.2 %  


 


Basophils # 0.0 10^3/uL  


 


Eosinophil Count 0.2 10^3/uL  


 


Sodium Level 137 mmol/L  


 


Potassium Level 3.6 mmol/L  


 


Chloride Level 100.0 mmol/L  


 


Carbon Dioxide Level 23.5 mmol/L  


 


Anion Gap 17.1  


 


Blood Urea Nitrogen 19 mg/dL  


 


Creatinine 1.75 mg/dL  


 


Estimated GFR (


American) 48.9 


 





 


BUN/Creatinine Ratio 10.0  


 


Glucose Level 156 mg/dL  


 


Calcium Level 8.9 mg/dL  


 


Total Bilirubin 0.4 mg/dL  


 


Aspartate Amino Transf


(AST/SGOT) 29 U/L 


 





 


Alanine Aminotransferase


(ALT/SGPT) 49 U/L 


 





 


Alkaline Phosphatase 79 U/L  


 


Total Creatine Kinase 254 U/L  


 


Creatine Kinase MB 5.3 ng/mL  


 


Troponin I < 0.02 ng/mL  < 0.02 ng/mL 


 


Total Protein 7.1 g/dL  


 


Albumin 3.7 g/dL  


 


Globulin 3.4  


 


Serum Alcohol 83 mg/dL  


 


Percent Immature Gran (Cell


Imm) 0.10 % 


 











Date of Reassessment:  Apr 17, 2019


Time of Reassessment:  0815


Blood Pressure Systolic:  127


Blood Pressure Diastolic:  70


Problems:  


(1) Syncope


ICD Code:  R55 - Syncope and collapse


SNOMED:  596426418


Status:  Acute


(2) Elevated ETOH level


ICD Code:  R78.0 - Finding of alcohol in blood


SNOMED:  031229133


Status:  Resolved


Resolution Date/Time:  4/17/19 @ 14:13


(3) Hypertension


ICD Code:  I10 - Essential (primary) hypertension


SNOMED:  68853443


Status:  Chronic


Departure


Discharge Disposition:  Home











MIGUEL BURNETT MD Apr 17, 2019 14:17

## 2019-04-30 NOTE — HPH
ADMIT DATE:  



CHIEF COMPLAINT:  Falling episode and possible seizure and was unconscious for

approximately 5 minutes.



HISTORY OF PRESENT ILLNESS:  The patient states that he had been working in the

yard and had drank beer earlier in the afternoon and did not feel well and

started getting hot when he was outside and upon coming in the house, this is

when he got very weak and fainted.  He had something similar approximately one

month ago.  He did not have any chest pain or shortness of breath.



PAST MEDICAL HISTORY:

1.  Hyperlipidemia.

2.  Hypertension.

3.  Asthma.

4.  Major depressive disorder.

5.  COPD.



PAST SURGICAL HISTORY:  Reviewed.



SOCIAL HISTORY:  Alcohol, tobacco -- None.



FAMILY HISTORY:  Reviewed.



MEDICATIONS:  See admit medication reconciliation form.



ALLERGIES:  No known drug allergies.



REVIEW OF SYSTEMS:

GENERAL:  Fainting episode as above.

CARDIAC:  Denies chest pain, orthopnea, PND or palpitations.

RESPIRATORY:  Denies shortness of breath, cough or congestion.

NEUROLOGIC:  Fainting episode as above.

Other than the above-mentioned symptoms and signs, the 11-point review of

systems is negative.



OBJECTIVE

VITAL SIGNS:  Temperature 97.7, pulse 70, respirations 20, blood pressure

129/84, pulse ox 96% on room air.

GENERAL:  This is a well-appearing male in no acute distress.

HEENT:  Atraumatic, normocephalic.

NECK:  Soft, supple, normal range of motion.  No bruits.

LUNGS:  Clear to auscultation bilaterally.

HEART:  Regular rate and rhythm without murmurs, S3 or S4.

ABDOMEN:  Soft, nontender, nondistended.

EXTREMITIES:  No edema, cyanosis or clubbing.

NEUROLOGIC:  Cranial nerves 2-12 are grossly intact and symmetric.



LABORATORY DATA:  WBC 8.9, hemoglobin 13.9, hematocrit 41.1.  Sodium 137,

potassium 3.6, BUN 19, creatinine 1.75, glucose 156.



Chest x-ray -- no acute cardiopulmonary disease.



Head CT -- no acute intracranial abnormality.



ASSESSMENT:

1.  Syncopal episode.

2.  Loss of consciousness.

3.  Acute renal failure.

4.  Dehydration.

5.  Probable heat exhaustion.

6.  Hyperglycemia.

7.  Hypertension.



PLAN:

1.  The patient is admitted to [facility] for further

evaluation and management.

2.  Start IV fluids with normal saline at 100 mL per hour for dehydration and

probable heat exhaustion as well as his renal failure.  Renal failure is likely

secondary to acute tubular necrosis from dehydration.

3.  We will evaluate the patient on the Med/Surg floor and evaluate him

overnight, checking his troponins, and ruling him out from a cardiac or

cerebrovascular disease standpoint.





______________________________

Darrell Miranda MD



DR:  DELMA/denton  JOB# [id number]  [id number]

DD:  04/30/2019 08:18  DT:  04/30/2019 09:20

## 2021-01-01 ENCOUNTER — HOSPITAL ENCOUNTER (EMERGENCY)
Dept: HOSPITAL 65 - ER | Age: 61
Discharge: HOME | End: 2021-01-01
Payer: COMMERCIAL

## 2021-01-01 VITALS — DIASTOLIC BLOOD PRESSURE: 45 MMHG | SYSTOLIC BLOOD PRESSURE: 83 MMHG

## 2021-01-01 VITALS — DIASTOLIC BLOOD PRESSURE: 56 MMHG | SYSTOLIC BLOOD PRESSURE: 95 MMHG

## 2021-01-01 VITALS — SYSTOLIC BLOOD PRESSURE: 110 MMHG | DIASTOLIC BLOOD PRESSURE: 61 MMHG

## 2021-01-01 VITALS — BODY MASS INDEX: 35.21 KG/M2 | HEIGHT: 72 IN | WEIGHT: 260 LBS

## 2021-01-01 DIAGNOSIS — J44.9: ICD-10-CM

## 2021-01-01 DIAGNOSIS — E11.9: ICD-10-CM

## 2021-01-01 DIAGNOSIS — Z79.899: ICD-10-CM

## 2021-01-01 DIAGNOSIS — E86.0: ICD-10-CM

## 2021-01-01 DIAGNOSIS — Z79.82: ICD-10-CM

## 2021-01-01 DIAGNOSIS — N17.9: Primary | ICD-10-CM

## 2021-01-01 LAB
ALP INTEST CFR SERPL: 84 U/L (ref 50–136)
ALT SERPL-CCNC: 58 U/L (ref 12–78)
AST SERPL-CCNC: 25 U/L (ref 0–35)
BASOPHILS # BLD AUTO: 0 10^3/UL (ref 0–0.1)
BASOPHILS NFR BLD AUTO: 0.3 % (ref 0–0.2)
CALCIUM SERPL-MCNC: 8.8 MG/DL (ref 8.4–10.5)
CO2 BLDA-SCNC: 25.7 MMOL/L (ref 20–32)
EOSINOPHIL # BLD AUTO: 0.2 10^3/UL (ref 0–0.2)
EOSINOPHIL NFR BLD AUTO: 2.2 % (ref 0–5)
ERYTHROCYTE [DISTWIDTH] IN BLOOD BY AUTOMATED COUNT: 12.7 % (ref 11.5–14.5)
GLUCOSE PRE 100 G GLC PO SERPL-MCNC: 168 MG/DL (ref 70–110)
LYMPHOCYTES # BLD AUTO: 1.64 10^3/UL1 (ref 1–4.8)
LYMPHOCYTES NFR BLD AUTO: 16.8 % (ref 24–44)
MCH RBC QN AUTO: 29 PG (ref 26–34)
MONOCYTES # BLD AUTO: 1 10^3/UL (ref 0.3–0.8)
MONOCYTES NFR BLD AUTO: 10 % (ref 5–12)
NEUTROPHILS # BLD AUTO: 6.9 10^3/UL (ref 1.8–7.7)
NEUTROPHILS NFR BLD AUTO: 70.5 % (ref 41–85)
PLATELET # BLD AUTO: 138 10^3/UL (ref 150–400)

## 2021-01-01 PROCEDURE — 83605 ASSAY OF LACTIC ACID: CPT

## 2021-01-01 PROCEDURE — 83880 ASSAY OF NATRIURETIC PEPTIDE: CPT

## 2021-01-01 PROCEDURE — 82553 CREATINE MB FRACTION: CPT

## 2021-01-01 PROCEDURE — 96360 HYDRATION IV INFUSION INIT: CPT

## 2021-01-01 PROCEDURE — 80053 COMPREHEN METABOLIC PANEL: CPT

## 2021-01-01 PROCEDURE — 84484 ASSAY OF TROPONIN QUANT: CPT

## 2021-01-01 PROCEDURE — 96361 HYDRATE IV INFUSION ADD-ON: CPT

## 2021-01-01 PROCEDURE — 93005 ELECTROCARDIOGRAM TRACING: CPT

## 2021-01-01 PROCEDURE — 85025 COMPLETE CBC W/AUTO DIFF WBC: CPT

## 2021-01-01 PROCEDURE — 82550 ASSAY OF CK (CPK): CPT

## 2021-01-01 PROCEDURE — 85610 PROTHROMBIN TIME: CPT

## 2021-01-01 PROCEDURE — 36415 COLL VENOUS BLD VENIPUNCTURE: CPT

## 2021-01-01 PROCEDURE — 85730 THROMBOPLASTIN TIME PARTIAL: CPT

## 2021-01-01 PROCEDURE — 71045 X-RAY EXAM CHEST 1 VIEW: CPT

## 2021-01-01 PROCEDURE — 99285 EMERGENCY DEPT VISIT HI MDM: CPT

## 2021-01-01 NOTE — DIREP
PROCEDURE:CHEST 1 VIEW

 

COMPARISON:None.

 

INDICATIONS:Hypotension and dizziness

 

FINDINGS:

LUNGS/PLEURA:No significant pulmonary parenchymal abnormalities. No effusions.

VASCULATURE:Normal.  Unremarkable pulmonary vasculature.

CARDIAC:Normal.  No cardiac silhouette abnormality or cardiomegaly. 

MEDIASTINUM:Normal.  No visible mass or adenopathy. 

BONES:Osteophytes in the thoracic spine.  Resection distal right clavicle 

unchanged from previous study.

OTHER:Negative.  

 

CONCLUSION:No acute cardiopulmonary abnormalities.  No change from previous 

study.

 

 

 

Dictated by: Deonte Pinto M.D. on 01/01/2021 at 07:13 PM     

Electronically Signed By: Deonte Pinto M.D. on 01/01/2021 at 07:15 PM

## 2021-01-01 NOTE — PCM.EKG
[facility]

                                       

Test Date:    2021               Test Time:    18:54:07

Pat Name:     LARRY PACK            Department:   

Patient ID:   [id number]          Room:          

Gender:       M                        Technician:   [place]

:          1960               Requested By: AMENA MEDLEY

Order Number: [id number]           Reading MD:   Amena MEDLEY

                                 Measurements

Intervals                              Axis          

Rate:         85                       P:            16

VT:           157                      QRS:          -2

QRSD:         89                       T:            39

QT:           383                                    

QTc:          456                                    

                           Interpretive Statements

Sinus rhythm

Compared to ECG 2019 02:02:38

No significant changes

Electronically Signed On 2021 6:18:29 CST by Amena MEDLEY



Please click the below link to view image of tracing.

## 2021-01-01 NOTE — ER.PDOC
General


Chief Complaint:  General Complaint


Stated Complaint:  HYPOTENSION


TRAVEL OUT OF US:  No


Time seen by MD:  19:20


Source:  patient


Exam Limitations:  no limitations





History of Present Illness


Initial Comments


Low blood pressure and dizziness this evening. No chest pain or SOB. She had 

COVID 19 around thanksgiving period.


Severity:  moderate


Associated Symptoms:  other (dizziness)


Allergies:  


Coded Allergies:  


     No Known Allergies (Unverified , 11/27/17)


Home Meds


Active Scripts


Lisinopril (LISINOPRIL) 10 Mg Tablet, 1 TAB PO DAILY, #30 TAB 5 Refills


   Prov:MIGUEL BURNETT MD         4/17/19


Atorvastatin 20MG (LIPITOR 20MG) 20 Mg Tablet, 40 MG PO HS for 30 Days, #30 

TABLET 0 Refills


   Prov:ALLYN VINSON MD         3/20/19


Reported Medications


Aspirin (ASPIR 81) 81 Mg Tablet.dr, 1 TAB PO DAILY, #30 TAB 5 Refills


   4/17/19


Citalopram Hydrobromide (CITALOPRAM HBR) 20 Mg Tablet, 20 MG PO HS, TABLET


   3/19/19





Past Medical History


Medical History:  COPD, diabetes


Surgical History:  shoulder





Family History


Significant Family History:  no pertinent family hx





Social History


Alcohol Use:  none


Drug Use:  none





Review of Systems


Constitutional:  no symptoms reported


EENTM:  see HPI


Respiratory:  no symptoms reported


Cardiovascular:  no symptoms reported


Gastrointestinal:  no symptoms reported


All Other Systems:  Reviewed and Negative





Physical Exam


General Appearance:  No Apparent Distress, WD/WN


Neck:  Non-Tender, Full Range of Motion, Supple, Normal Inspection


Respiratory:  chest non-tender, lungs clear, normal breath sounds, no 

respiratory distress, no accessory muscle use


CVS:  reg rate & rhythm, no murmur, no gallop, pulses nml, nml capillary refill


Gastrointestinal:  Normal Bowel Sounds, No Organomegaly, No Pulsatile Mass, Non 

Tender


Back:  Normal Inspection, No CVA Tenderness, No Vertebral Tenderness


Extremities:  Normal Range of Motion


Neurologic/Psychiatric:  CNs II-XII NML as Tested


Skin:  Normal Color





Results/Orders


Results/Orders





Orders - AMENA MEDLEY MD


0.9 % Sodium Chloride (Ns 1000ml) (1/1/21 18:46)


Cbc With Auto Diff (1/1/21 18:46)


Comprehensive Metabolic Panel (1/1/21 18:46)


Creatine Kinase (1/1/21 18:46)


Creatine Kinase Mb (1/1/21 18:46)


Troponin I (1/1/21 18:46)


Probnp    B-Type Np (1/1/21 18:46)


PT (1/1/21 18:46)


Partial Thromboplastin Time. (1/1/21 18:46)


Xr Chest 1v (1/1/21 18:46)


Ekg-Routine (1/1/21 18:46)


0.9 % Sodium Chloride (Ns 1000ml) (1/1/21 18:46)


Lactic Acid(Ml) (1/1/21 18:46)


0.9 % Sodium Chloride (Ns 1000ml) (1/1/21 19:21)


0.9 % Sodium Chloride (Ns 1000ml) (1/1/21 19:21)





Vital Signs








  Date Time  Temp Pulse Resp B/P (MAP) Pulse Ox O2 Delivery O2 Flow Rate FiO2


 


1/1/21 18:33 97.9 91 16  93   


 


1/1/21 18:33 97.9 91 16 83/45 (58) 93 Room Air  


 


1/1/21 18:33 97.9 91 16     








Administered Medications








 Medications


  (Trade)  Dose


 Ordered  Sig/Laura


 Route


 PRN Reason  Start Time


 Stop Time Status Last Admin


Dose Admin


 


 Sodium Chloride  1,000 ml @ 


 1,200 mls/hr  Q50M STAT


 IV


   1/1/21 18:46


 1/1/21 19:35 DC 1/1/21 18:56


1,200 MLS/HR


 


 Sodium Chloride  1,000 ml @ 


 1,200 mls/hr  Q50M STAT


 IV


   1/1/21 19:21


 1/1/21 20:10 DC 1/1/21 19:44


1,200 MLS/HR








                                Laboratory Tests








Test


 1/1/21


18:27 1/1/21


18:56


 


White Blood Count


 9.8 10^3/uL


(4.5-11.0) 





 


Red Blood Count


 5.28 10^6/uL


(4.50-5.90) 





 


Hemoglobin


 15.3 g/dL


(13.9-16.3) 





 


Hematocrit


 45.2 %


(37.0-53.0) 





 


Mean Corpuscular Volume


 85.6 fL


() 





 


Mean Corpuscular Hemoglobin


 29.0 pg


(26-34) 





 


Mean Corpuscular Hemoglobin


Concent 33.8 g/dL


(33-36.5) 





 


Red Cell Distribution Width


 12.7 %


(11.5-14.5) 





 


Platelet Count


 138 10^3/uL


(150-400)  L 





 


Mean Platelet Volume


 11.3 fL


(7.8-11.0)  H 





 


Neutrophils (%) (Auto)


 70.5 %


(41.0-85.0) 





 


Lymphocytes (%) (Auto)


 16.8 %


(24.0-44.0)  L 





 


Monocytes (%) (Auto)


 10.0 %


(5.0-12.0) 





 


Neutrophils # (Auto)


 6.9 10^3/uL


(1.8-7.7) 





 


Lymphocytes # (Auto)


 1.64 10^3/uL1


(1.0-4.8) 





 


Monocytes # (Auto)


 1.0 10^3/uL


(0.3-0.8)  H 





 


Absolute Immature Granulocyte


(auto 0.02 10^3 u/L


(0-2) 





 


Absolute Eosinophils (auto)


 0.2 10^3/uL


(0.0-0.2) 





 


Immature Granulocytes %


 0.20 %


(0.00-0.50) 





 


Eosinophils %


 2.2 %


(0.0-5.0) 





 


Basophils %


 0.3 %


(0.0-0.2)  H 





 


Basophils #


 0.0 10^3/uL


(0.0-0.1) 





 


Prothrombin Time


 10.4 SEC


(9.3-11.3) 





 


Prothrombin Time INR


(Non-Therap) 1.0  


 





 


Activated Partial


Thromboplast Time 24.2 SEC


(24.67-30.72) 





 


Sodium Level


 135 mmol/L


(132-145) 





 


Potassium Level


 4.0 mmol/L


(3.6-5.2) 





 


Chloride Level


 100.0 mmol/L


() 





 


Carbon Dioxide Level


 25.7 mmol/L


(20.0-32) 





 


Anion Gap 13.3   


 


Blood Urea Nitrogen


 36 mg/dL


(7-18)  H 





 


Creatinine


 2.19 mg/dL


(0.59-1.40)  *H 





 


Estimated GFR (


American) 37.5 (>/=60)  


 





 


Est GFR (CKD-EPI)(Non-Afr


American) 31.0 (>/=60)  


 





 


BUN/Creatinine Ratio 16.0   


 


Glucose Level


 168 mg/dL


()  H 





 


Calcium Level


 8.8 mg/dL


(8.4-10.5) 





 


Total Bilirubin


 0.7 mg/dL


(0.2-1.0) 





 


Aspartate Amino Transferase


(AST) 25 U/L (0-35)  


 





 


Alanine Aminotransferase (ALT)


 58 U/L (12-78)


 





 


Alkaline Phosphatase


 84 U/L


() 





 


Total Creatine Kinase


 130 U/L


() 





 


Creatine Kinase MB


 2.3 ng/mL


(0.5-3.6) 





 


Troponin I


 < 0.02 ng/mL


(0.00-0.05) 





 


Pro-B-Type Natriuretic Peptide


 33 pg/mL


(0-125) 





 


Total Protein


 7.6 g/dL


(6.4-8.2) 





 


Albumin


 3.7 g/dL


(3.4-5.0) 





 


Globulin 3.9   


 


Albumin/Globulin Ratio 0.948   


 


Lactic Acid Level


 


 1.5 mmol/L


(0.5-1.9)











Progress


Progress


Patient feeling better after hydration and blood pressure back to normal. He has

chronic kidney injury with a previous creatinine of 1.7, it is 2.1 today. He is 

ready to go home.





EKG/XRAY/CT/US


EKG:  NSR, no ST T wave changes


EKG Comments:  Rate 85, normal axis


XRAY:  chest (No active disease)





ER DEPART


Departure


Time of Disposition:  20:18


Disposition:  01 HOME, SELF-CARE


Impression:  


   Primary Impression:  


   Dehydration


   Additional Impressions:  


   Hypotension


   Acute kidney failure, unspecified


Condition:  Improved


Referrals:  


TONY RIVERO (PCP)


PRIMARY CARE PROVIDER





Additional Instructions:  


Push fluids at home


F/U with your PCP in 2-3 days


Return to ED if worsening or concerns


Duration or Time Spent with Pa:  60 min





Problem Qualifiers








   Additional Impressions:  


   Hypotension


   Hypotension type:  unspecified hypotension type  Qualified Codes:  I95.9 - 

   Hypotension, unspecified


   Acute kidney failure, unspecified


   Acute renal failure type:  unspecified  Qualified Codes:  N17.9 - Acute 

   kidney failure, unspecified








AMENA MEDLEY MD                 Jan 1, 2021 20:20